# Patient Record
Sex: MALE | Race: OTHER | NOT HISPANIC OR LATINO
[De-identification: names, ages, dates, MRNs, and addresses within clinical notes are randomized per-mention and may not be internally consistent; named-entity substitution may affect disease eponyms.]

---

## 2023-07-24 PROBLEM — Z00.00 ENCOUNTER FOR PREVENTIVE HEALTH EXAMINATION: Status: ACTIVE | Noted: 2023-07-24

## 2023-07-26 ENCOUNTER — APPOINTMENT (OUTPATIENT)
Dept: SURGERY | Facility: CLINIC | Age: 38
End: 2023-07-26
Payer: COMMERCIAL

## 2023-07-26 ENCOUNTER — NON-APPOINTMENT (OUTPATIENT)
Age: 38
End: 2023-07-26

## 2023-07-26 VITALS
HEIGHT: 76 IN | DIASTOLIC BLOOD PRESSURE: 65 MMHG | HEART RATE: 60 BPM | BODY MASS INDEX: 25.69 KG/M2 | SYSTOLIC BLOOD PRESSURE: 106 MMHG | TEMPERATURE: 98.2 F | WEIGHT: 211 LBS | OXYGEN SATURATION: 100 %

## 2023-07-26 DIAGNOSIS — Z77.22 CONTACT WITH AND (SUSPECTED) EXPOSURE TO ENVIRONMENTAL TOBACCO SMOKE (ACUTE) (CHRONIC): ICD-10-CM

## 2023-07-26 DIAGNOSIS — A69.20 LYME DISEASE, UNSPECIFIED: ICD-10-CM

## 2023-07-26 DIAGNOSIS — Z78.9 OTHER SPECIFIED HEALTH STATUS: ICD-10-CM

## 2023-07-26 DIAGNOSIS — Z82.49 FAMILY HISTORY OF ISCHEMIC HEART DISEASE AND OTHER DISEASES OF THE CIRCULATORY SYSTEM: ICD-10-CM

## 2023-07-26 DIAGNOSIS — S90.122A CONTUSION OF LEFT LESSER TOE(S) W/OUT DAMAGE TO NAIL, INITIAL ENCOUNTER: ICD-10-CM

## 2023-07-26 PROCEDURE — 99203 OFFICE O/P NEW LOW 30 MIN: CPT

## 2023-07-26 NOTE — PLAN
[FreeTextEntry1] : This is a 38-year-old male without significant past medical history who states that because he wears poor fitting shoes that 2 years ago his left big toe nail turned black.  Is been that way ever since and he is here to be evaluated to have it addressed.  He denies pain.  He denies bleeding.  He states that he has changed his shoes and has had no recent trauma to the foot.\par \par Limited review of systems: He denies pain in his feet.  He denies blood.  He denies any adenopathy in his groin.\par \par Limited exam: His left hallux has a black smooth area underneath the toenail.  His nail appears to be deformed.  There is no evidence of white-colored fungus.  His right hallux also has a dark area on its lateral surface.\par \par Plan: I suspect that he had trauma to the toenail that resulted in a subungual hematoma that is now calcified and now his toenail is migrating over it.  Not experienced in this problem and this seems unusual to me as out expect any trauma and blood to resolve after 2 years.  The lesion appears to be quite large and is unlikely this would be something like a melanoma.  As I do not treat toenails I will refer him to podiatry.  He will follow me on as-needed basis.